# Patient Record
Sex: MALE | Race: BLACK OR AFRICAN AMERICAN | NOT HISPANIC OR LATINO | URBAN - METROPOLITAN AREA
[De-identification: names, ages, dates, MRNs, and addresses within clinical notes are randomized per-mention and may not be internally consistent; named-entity substitution may affect disease eponyms.]

---

## 2022-03-25 ENCOUNTER — CLAIMS CREATED FROM THE CLAIM WINDOW (OUTPATIENT)
Dept: URBAN - METROPOLITAN AREA CLINIC 94 | Facility: CLINIC | Age: 57
End: 2022-03-25
Payer: COMMERCIAL

## 2022-03-25 ENCOUNTER — WEB ENCOUNTER (OUTPATIENT)
Dept: URBAN - METROPOLITAN AREA CLINIC 94 | Facility: CLINIC | Age: 57
End: 2022-03-25

## 2022-03-25 VITALS
BODY MASS INDEX: 33.2 KG/M2 | TEMPERATURE: 97.7 F | DIASTOLIC BLOOD PRESSURE: 92 MMHG | HEIGHT: 75 IN | HEART RATE: 82 BPM | WEIGHT: 267 LBS | SYSTOLIC BLOOD PRESSURE: 158 MMHG

## 2022-03-25 DIAGNOSIS — R59.1 LYMPHADENOPATHY: ICD-10-CM

## 2022-03-25 DIAGNOSIS — R63.4 WEIGHT LOSS: ICD-10-CM

## 2022-03-25 DIAGNOSIS — K30 INDIGESTION: ICD-10-CM

## 2022-03-25 DIAGNOSIS — K52.9 COLITIS: ICD-10-CM

## 2022-03-25 DIAGNOSIS — R19.7 DIARRHEA, UNSPECIFIED TYPE: ICD-10-CM

## 2022-03-25 PROCEDURE — 99204 OFFICE O/P NEW MOD 45 MIN: CPT | Performed by: PHYSICIAN ASSISTANT

## 2022-03-25 PROCEDURE — 99204 OFFICE O/P NEW MOD 45 MIN: CPT | Performed by: INTERNAL MEDICINE

## 2022-03-25 RX ORDER — ASPIRIN 81 MG/1
1 TABLET TABLET, COATED ORAL ONCE A DAY
Refills: 0 | Status: ON HOLD | COMMUNITY
Start: 1900-01-01

## 2022-03-25 RX ORDER — AMLODIPINE BESYLATE 5 MG/1
1 TABLET TABLET ORAL ONCE A DAY
Status: ACTIVE | COMMUNITY

## 2022-03-25 RX ORDER — LEVOTHYROXINE SODIUM 50 UG/1
1 TABLET IN THE MORNING ON AN EMPTY STOMACH TABLET ORAL ONCE A DAY
Status: ACTIVE | COMMUNITY

## 2022-03-25 RX ORDER — RIVAROXABAN 10 MG/1
1 TABLET TABLET, FILM COATED ORAL ONCE A DAY
Refills: 0 | Status: ACTIVE | COMMUNITY
Start: 1900-01-01

## 2022-03-25 NOTE — HPI-TODAY'S VISIT:
58 yo for diarrhea and weight loss.  Patient is accompanied by his wife.   Pt evaluated today following a 3 week hx of diarrhea. He reports having 3-4 stools/day, denies melena or hematochezia. Until today, patient denied abdominal pain but today while in the office, he is experiencing lower abdominal pain. Denies fever, chills, Pt has also had a 10 lb weight loss due to poor appetite. Pt also feels very lethargic. Pt does report taking Pepto Bismol for sx.   Pt reports a hx of nocturnal indigestion. He wakes up and feels burning in his stomach.This requires him to sit up on pillows. He is taking Nexium OTC before bed which helps some.   Pt was evaluated at [facility] 3/20/2021  CT Multiple enlarged lymph nodes in the abdomen and pelvis and lower mediastinum, Fatty infiltration proximal colonic wall may suggest a chronic proximal colitis. No bowel obstruction or evidence of acute bowel inflammation. There is evidence of prior ventral abdominal hernia repair. WBC 15, Hgb 13, GI PCR negative Rx Lomotil x 1   PCP also ordered GI PCR negative, ,   PMH: PE/DVT Xarelto Rx by hem/onc,  RA  Dad has prostate cancer.  Previous Colonsocopy 1/2017 - benign stricture sigmoid colon - non-transversed, Referred to colorectal DR Zelaya- Laparoscopic-assisted left colectomy with colocolic anastomosis 2/2017, Bx Colonic mucosa with ulceration, hemorrhage, reactive/regenerative crypts and features of ischemic colitis.   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 Langston- internal hemorrhoids otherwise normal to cecum.   EGD 2015 bile gastritis and duodenitis

## 2022-03-25 NOTE — PHYSICAL EXAM CHEST:
no lesions,  no deformities,  no traumatic injuries,  no significant scars are present,  chest wall non-tender,  no masses present, breathing is unlabored without accessory muscle use,normal breath sounds I will STOP taking the medications listed below when I get home from the hospital:  None

## 2022-03-25 NOTE — PHYSICAL EXAM GASTROINTESTINAL
Abdomen , soft, epigastric and lower abdominal tender, nondistended , no guarding or rigidity , no masses palpable , normal bowel sounds , Liver and Spleen , no hepatomegaly present , no hepatosplenomegaly , liver nontender , spleen not palpable

## 2022-04-05 ENCOUNTER — OFFICE VISIT (OUTPATIENT)
Dept: URBAN - METROPOLITAN AREA SURGERY CENTER 17 | Facility: SURGERY CENTER | Age: 57
End: 2022-04-05
Payer: COMMERCIAL

## 2022-04-05 DIAGNOSIS — D12.0 ADENOMA OF CECUM: ICD-10-CM

## 2022-04-05 DIAGNOSIS — R19.7 ACUTE DIARRHEA: ICD-10-CM

## 2022-04-05 DIAGNOSIS — K63.89 BACTERIAL OVERGROWTH SYNDROME: ICD-10-CM

## 2022-04-05 PROCEDURE — G8907 PT DOC NO EVENTS ON DISCHARG: HCPCS | Performed by: INTERNAL MEDICINE

## 2022-04-05 PROCEDURE — 45380 COLONOSCOPY AND BIOPSY: CPT | Performed by: INTERNAL MEDICINE

## 2022-04-05 RX ORDER — RIVAROXABAN 10 MG/1
1 TABLET TABLET, FILM COATED ORAL ONCE A DAY
Refills: 0 | Status: ACTIVE | COMMUNITY
Start: 1900-01-01

## 2022-04-05 RX ORDER — ASPIRIN 81 MG/1
1 TABLET TABLET, COATED ORAL ONCE A DAY
Refills: 0 | Status: ON HOLD | COMMUNITY
Start: 1900-01-01

## 2022-04-05 RX ORDER — LEVOTHYROXINE SODIUM 50 UG/1
1 TABLET IN THE MORNING ON AN EMPTY STOMACH TABLET ORAL ONCE A DAY
Status: ACTIVE | COMMUNITY

## 2022-04-05 RX ORDER — CHOLESTYRAMINE 4 G/9G
1 SCOOP POWDER, FOR SUSPENSION ORAL TWICE A DAY
Qty: 1 | Refills: 3 | OUTPATIENT

## 2022-04-05 RX ORDER — AMLODIPINE BESYLATE 5 MG/1
1 TABLET TABLET ORAL ONCE A DAY
Status: ACTIVE | COMMUNITY

## 2022-04-20 ENCOUNTER — OFFICE VISIT (OUTPATIENT)
Dept: URBAN - METROPOLITAN AREA CLINIC 74 | Facility: CLINIC | Age: 57
End: 2022-04-20

## 2022-04-28 ENCOUNTER — TELEPHONE ENCOUNTER (OUTPATIENT)
Dept: URBAN - METROPOLITAN AREA CLINIC 94 | Facility: CLINIC | Age: 57
End: 2022-04-28

## 2022-04-29 ENCOUNTER — TELEPHONE ENCOUNTER (OUTPATIENT)
Dept: URBAN - METROPOLITAN AREA CLINIC 94 | Facility: CLINIC | Age: 57
End: 2022-04-29

## 2022-04-29 ENCOUNTER — OFFICE VISIT (OUTPATIENT)
Dept: URBAN - METROPOLITAN AREA SURGERY CENTER 17 | Facility: SURGERY CENTER | Age: 57
End: 2022-04-29
Payer: COMMERCIAL

## 2022-04-29 DIAGNOSIS — R63.4 ABNORMAL INTENTIONAL WEIGHT LOSS: ICD-10-CM

## 2022-04-29 DIAGNOSIS — K29.60 ADENOPAPILLOMATOSIS GASTRICA: ICD-10-CM

## 2022-04-29 DIAGNOSIS — R12 BURNING REFLUX: ICD-10-CM

## 2022-04-29 DIAGNOSIS — K29.80 ACUTE DUODENITIS: ICD-10-CM

## 2022-04-29 PROCEDURE — 43239 EGD BIOPSY SINGLE/MULTIPLE: CPT | Performed by: INTERNAL MEDICINE

## 2022-04-29 PROCEDURE — G8907 PT DOC NO EVENTS ON DISCHARG: HCPCS | Performed by: INTERNAL MEDICINE

## 2022-04-29 RX ORDER — RIVAROXABAN 10 MG/1
1 TABLET TABLET, FILM COATED ORAL ONCE A DAY
Refills: 0 | Status: ACTIVE | COMMUNITY
Start: 1900-01-01

## 2022-04-29 RX ORDER — CHOLESTYRAMINE 4 G/9G
1 SCOOP POWDER, FOR SUSPENSION ORAL TWICE A DAY
Qty: 1 | Refills: 3 | Status: ACTIVE | COMMUNITY

## 2022-04-29 RX ORDER — ASPIRIN 81 MG/1
1 TABLET TABLET, COATED ORAL ONCE A DAY
Refills: 0 | Status: ON HOLD | COMMUNITY
Start: 1900-01-01

## 2022-04-29 RX ORDER — AMLODIPINE BESYLATE 5 MG/1
1 TABLET TABLET ORAL ONCE A DAY
Status: ACTIVE | COMMUNITY

## 2022-04-29 RX ORDER — LEVOTHYROXINE SODIUM 50 UG/1
1 TABLET IN THE MORNING ON AN EMPTY STOMACH TABLET ORAL ONCE A DAY
Status: ACTIVE | COMMUNITY

## 2022-05-04 ENCOUNTER — OFFICE VISIT (OUTPATIENT)
Dept: URBAN - METROPOLITAN AREA SURGERY CENTER 17 | Facility: SURGERY CENTER | Age: 57
End: 2022-05-04

## 2022-05-05 ENCOUNTER — TELEPHONE ENCOUNTER (OUTPATIENT)
Dept: URBAN - METROPOLITAN AREA CLINIC 94 | Facility: CLINIC | Age: 57
End: 2022-05-05

## 2022-05-05 ENCOUNTER — TELEPHONE ENCOUNTER (OUTPATIENT)
Dept: URBAN - METROPOLITAN AREA CLINIC 40 | Facility: CLINIC | Age: 57
End: 2022-05-05

## 2022-05-11 ENCOUNTER — OFFICE VISIT (OUTPATIENT)
Dept: URBAN - METROPOLITAN AREA CLINIC 92 | Facility: CLINIC | Age: 57
End: 2022-05-11
Payer: COMMERCIAL

## 2022-05-11 VITALS
HEART RATE: 84 BPM | TEMPERATURE: 97.8 F | SYSTOLIC BLOOD PRESSURE: 138 MMHG | WEIGHT: 245 LBS | HEIGHT: 75 IN | BODY MASS INDEX: 30.46 KG/M2 | DIASTOLIC BLOOD PRESSURE: 92 MMHG

## 2022-05-11 DIAGNOSIS — R19.7 DIARRHEA, UNSPECIFIED TYPE: ICD-10-CM

## 2022-05-11 DIAGNOSIS — R59.1 LYMPHADENOPATHY: ICD-10-CM

## 2022-05-11 DIAGNOSIS — R63.4 WEIGHT LOSS: ICD-10-CM

## 2022-05-11 PROCEDURE — 99215 OFFICE O/P EST HI 40 MIN: CPT | Performed by: INTERNAL MEDICINE

## 2022-05-11 RX ORDER — AMLODIPINE BESYLATE 5 MG/1
1 TABLET TABLET ORAL ONCE A DAY
Status: ACTIVE | COMMUNITY

## 2022-05-11 RX ORDER — ASPIRIN 81 MG/1
1 TABLET TABLET, COATED ORAL ONCE A DAY
Refills: 0 | Status: ON HOLD | COMMUNITY
Start: 1900-01-01

## 2022-05-11 RX ORDER — RIVAROXABAN 10 MG/1
1 TABLET TABLET, FILM COATED ORAL ONCE A DAY
Refills: 0 | Status: ACTIVE | COMMUNITY
Start: 1900-01-01

## 2022-05-11 RX ORDER — LEVOTHYROXINE SODIUM 50 UG/1
1 TABLET IN THE MORNING ON AN EMPTY STOMACH TABLET ORAL ONCE A DAY
Status: ON HOLD | COMMUNITY

## 2022-05-11 RX ORDER — CHOLESTYRAMINE 4 G/9G
1 SCOOP POWDER, FOR SUSPENSION ORAL TWICE A DAY
Qty: 1 | Refills: 3 | Status: ON HOLD | COMMUNITY

## 2022-05-11 NOTE — HPI-TODAY'S VISIT:
This is a 57-year-old male who now presents for follow-up.  He has MGUS and protein S deficiency with a history of PE/DVT and is on anticoagulation with Xarelto. He previously underwent a colonoscopy in January 2017 that demonstrated benign stricture in the sigmoid colon that was not traversed.  He underwent laparoscopic-assisted left colectomy with colocolonic anastomosis by Dr. Nazario Singh in February 2017. Final pathology revealed colonic mucosa with ulceration and hemorrhage and reactive/regenerative crept features suggestive of ischemic colitis.     He has experienced 30 lb unintentional weight loss in the past several months associated with diarrhea and flushing.  He has been undergoing evaluation by Hematology/Oncology and had a CT of the abdomen and pelvis on 03/20/2022 demonstrating multiple prominent enlarged lymph nodes throughout the abdominal mesentery and retroperitoneum the largest is an enlarged periportal lymph node measuring 4.2 cm.  There is a fatty infiltration around the proximal colonic wall suggestive of chronic proximal colitis.  EGD and colonoscopy on 04/05/2022 showed granular terminal ileum and prior end-to-end colocolonic anastomosis in the sigmoid colon with retained stool residue as well as internal hemorrhoids.  Biopsies exclude microscopic colitis.  EGD on 04/29/2022 demonstrated a small hiatal hernia and gastritis.  Random biopsy and the antrum and duodenum are pending.   He underwent a percutaneous biopsy of the lymph node yesterday and was restarted on Xarelto.  He has experienced lightheadedness and had a near syncopal episode on the way to the clinic.

## 2022-05-16 ENCOUNTER — TELEPHONE ENCOUNTER (OUTPATIENT)
Dept: URBAN - METROPOLITAN AREA CLINIC 94 | Facility: CLINIC | Age: 57
End: 2022-05-16

## 2022-07-26 ENCOUNTER — OFFICE VISIT (OUTPATIENT)
Dept: URBAN - METROPOLITAN AREA CLINIC 94 | Facility: CLINIC | Age: 57
End: 2022-07-26

## 2022-07-26 ENCOUNTER — OFFICE VISIT (OUTPATIENT)
Dept: URBAN - METROPOLITAN AREA CLINIC 40 | Facility: CLINIC | Age: 57
End: 2022-07-26

## 2022-07-26 ENCOUNTER — LAB OUTSIDE AN ENCOUNTER (OUTPATIENT)
Dept: URBAN - METROPOLITAN AREA CLINIC 40 | Facility: CLINIC | Age: 57
End: 2022-07-26

## 2022-07-26 ENCOUNTER — OFFICE VISIT (OUTPATIENT)
Dept: URBAN - METROPOLITAN AREA CLINIC 40 | Facility: CLINIC | Age: 57
End: 2022-07-26
Payer: COMMERCIAL

## 2022-07-26 VITALS
HEIGHT: 75 IN | DIASTOLIC BLOOD PRESSURE: 80 MMHG | SYSTOLIC BLOOD PRESSURE: 130 MMHG | TEMPERATURE: 98.1 F | BODY MASS INDEX: 29.47 KG/M2 | WEIGHT: 237 LBS | HEART RATE: 78 BPM

## 2022-07-26 DIAGNOSIS — R19.7 DIARRHEA, UNSPECIFIED TYPE: ICD-10-CM

## 2022-07-26 DIAGNOSIS — R63.4 WEIGHT LOSS: ICD-10-CM

## 2022-07-26 PROCEDURE — 99214 OFFICE O/P EST MOD 30 MIN: CPT | Performed by: INTERNAL MEDICINE

## 2022-07-26 RX ORDER — AMLODIPINE BESYLATE 5 MG/1
1 TABLET TABLET ORAL ONCE A DAY
Status: ACTIVE | COMMUNITY

## 2022-07-26 RX ORDER — COLESTIPOL HYDROCHLORIDE 1 G/1
1 TABLET TABLET, FILM COATED ORAL
Qty: 180 | Refills: 1 | OUTPATIENT
Start: 2022-07-26

## 2022-07-26 RX ORDER — ASPIRIN 81 MG/1
1 TABLET TABLET, COATED ORAL ONCE A DAY
Refills: 0 | Status: DISCONTINUED | COMMUNITY
Start: 1900-01-01

## 2022-07-26 RX ORDER — RIVAROXABAN 10 MG/1
1 TABLET TABLET, FILM COATED ORAL ONCE A DAY
Refills: 0 | Status: ACTIVE | COMMUNITY
Start: 1900-01-01

## 2022-07-26 RX ORDER — LEVOTHYROXINE SODIUM 50 UG/1
1 TABLET IN THE MORNING ON AN EMPTY STOMACH TABLET ORAL ONCE A DAY
Status: DISCONTINUED | COMMUNITY

## 2022-07-26 RX ORDER — CHOLESTYRAMINE 4 G/9G
1 SCOOP POWDER, FOR SUSPENSION ORAL TWICE A DAY
Qty: 1 | Refills: 3 | Status: DISCONTINUED | COMMUNITY

## 2022-07-26 NOTE — HPI-TODAY'S VISIT:
Mr Burns is a 57-year-old black male presenting for follow-up of weight loss and diarrhea.  He has a history of ischemic colitis status post resection in 2017.  Patient has been seen by multiple providers in our practice including Dr. Langston, Dr. Romeo and most recently Dr. Rebolledo who he had requested a second opinion from. He was to have seen Dr Langston today but was placed in my clinic due to Dr Langston having an emergency and not seeing patients today.  The patient reports issues with diarrhea and a 30 pound weight loss over the last several months.  Work-up has included stool studies that were reportedly negative for infection.  He had an EGD and colonoscopy in April.  Colonoscopy had a suboptimal prep.  Biopsies were negative for microscopic colitis.  EGD was notable for gastroduodenitis.  Patient has a history of MGUS and protein S deficiency managed by Dr. Taveras.  Dr. Taveras's last note from May 18 was reviewed.  Most recent CT scan was in March that was interpreted by [facility] in April.  This was significant for hepatomegaly.  Spleen pancreas gallbladder was unremarkable.  There was a cyst in the left kidney.  Lymph nodes were noted to be enlarged.  Patient had a lymph node biopsy that showed benign changes.  Hematology's note indicates that they were treating him with Lomotil as well as Carafate and Protonix and Zofran.  He is also instructed to consume 5 Ensure/boost shakes daily in 64 ounces of fluid.  Patient states that the Lomotil helps slow things down but this soon as he stops taking the medication the diarrhea returns.  He states he is eating fine his appetite is good but as soon as he eats, an hour later it gives him the urgency to have a bowel movement.  He is having 3-4 stools a day.  They are not waking him from sleep.  There is no blood in the stool or abdominal pain.  He is noting significant weakness with his symptoms.

## 2022-08-02 LAB
FECAL FAT, QUALITATIVE: (no result)
GASTROINTESTINAL PATHOGEN: (no result)
PANCREATICELASTASE ELISA, STOOL: (no result)

## 2022-08-29 ENCOUNTER — TELEPHONE ENCOUNTER (OUTPATIENT)
Dept: URBAN - METROPOLITAN AREA CLINIC 40 | Facility: CLINIC | Age: 57
End: 2022-08-29

## 2022-09-06 ENCOUNTER — OFFICE VISIT (OUTPATIENT)
Dept: URBAN - METROPOLITAN AREA CLINIC 40 | Facility: CLINIC | Age: 57
End: 2022-09-06

## 2022-09-06 RX ORDER — COLESTIPOL HYDROCHLORIDE 1 G/1
1 TABLET TABLET, FILM COATED ORAL
Qty: 180 | Refills: 1 | Status: ACTIVE | COMMUNITY
Start: 2022-07-26

## 2022-09-06 RX ORDER — AMLODIPINE BESYLATE 5 MG/1
1 TABLET TABLET ORAL ONCE A DAY
Status: ACTIVE | COMMUNITY

## 2022-09-06 RX ORDER — RIVAROXABAN 10 MG/1
1 TABLET TABLET, FILM COATED ORAL ONCE A DAY
Refills: 0 | Status: ACTIVE | COMMUNITY
Start: 1900-01-01

## 2022-09-06 RX ORDER — COLESTIPOL HYDROCHLORIDE 1 G/1
1 TABLET TABLET, FILM COATED ORAL
Qty: 180 | Refills: 1 | OUTPATIENT

## 2022-09-06 RX ORDER — MOMETASONE FUROATE 1 MG/G
1 APPLICATION OINTMENT TOPICAL ONCE A DAY
Status: ACTIVE | COMMUNITY

## 2022-09-06 RX ORDER — DIPHENOXYLATE HYDROCHLORIDE AND ATROPINE SULFATE 2.5; .025 MG/5ML; MG/5ML
5 ML AS NEEDED SOLUTION ORAL
Status: ACTIVE | COMMUNITY

## 2022-09-06 NOTE — HPI-TODAY'S VISIT:
Mr Burns is a 57-year-old black male presenting for follow-up of weight loss and diarrhea.  He has a history of ischemic colitis status post resection in 2017.  Patient has been seen by multiple providers in our practice including Dr. Langston, Dr. Romeo and most recently Dr. Rebolledo who he had requested a second opinion from. He was to have seen Dr Langston today but was placed in my clinic due to Dr Langston having an emergency and not seeing patients today.  The patient reports issues with diarrhea and a 30 pound weight loss over the last several months.  Work-up has included stool studies that were reportedly negative for infection.  He had an EGD and colonoscopy in April.  Colonoscopy had a suboptimal prep.  Biopsies were negative for microscopic colitis.  EGD was notable for gastroduodenitis.  Patient has a history of MGUS and protein S deficiency managed by Dr. Taveras.  Dr. Taveras's last note from May 18 was reviewed.  Most recent CT scan was in March that was interpreted by [facility] in April.  This was significant for hepatomegaly.  Spleen pancreas gallbladder was unremarkable.  There was a cyst in the left kidney.  Lymph nodes were noted to be enlarged.  Patient had a lymph node biopsy that showed benign changes.  Hematology's note indicates that they were treating him with Lomotil as well as Carafate and Protonix and Zofran.  He is also instructed to consume 5 Ensure/boost shakes daily in 64 ounces of fluid.  Patient states that the Lomotil helps slow things down but this soon as he stops taking the medication the diarrhea returns.  He states he is eating fine his appetite is good but as soon as he eats, an hour later it gives him the urgency to have a bowel movement.  He is having 3-4 stools a day.  They are not waking him from sleep.  There is no blood in the stool or abdominal pain.  He is noting significant weakness with his symptoms. Patient underwent a GPP 14 which was completely negative including negative qualitative fecal fat this was collected on 7/26/2022.

## 2022-11-24 ENCOUNTER — DASHBOARD ENCOUNTERS (OUTPATIENT)
Age: 57
End: 2022-11-24

## 2022-11-24 PROBLEM — 162031009: Status: ACTIVE | Noted: 2022-03-27

## 2022-11-29 ENCOUNTER — OFFICE VISIT (OUTPATIENT)
Dept: URBAN - METROPOLITAN AREA CLINIC 40 | Facility: CLINIC | Age: 57
End: 2022-11-29

## 2022-11-29 RX ORDER — COLESTIPOL HYDROCHLORIDE 1 G/1
1 TABLET TABLET, FILM COATED ORAL
Qty: 180 | Refills: 1 | OUTPATIENT

## 2022-11-29 NOTE — HPI-TODAY'S VISIT:
Mr Burns is a 57-year-old black male presenting for follow-up of weight loss and diarrhea.  He has a history of ischemic colitis status post resection in 2017.  Patient has been seen by multiple providers in our practice including Dr. Langston, Dr. Romeo and most recently Dr. Rebolledo who he had requested a second opinion from. He was to have seen Dr Langston today but was placed in my clinic due to Dr Langston having an emergency and not seeing patients today.  The patient reports issues with diarrhea and a 30 pound weight loss over the last several months.  Work-up has included stool studies that were reportedly negative for infection.  He had an EGD and colonoscopy in April.  Colonoscopy had a suboptimal prep.  Biopsies were negative for microscopic colitis.  EGD was notable for gastroduodenitis.  Patient has a history of MGUS and protein S deficiency managed by Dr. Taveras.  Dr. Taveras's last note from May 18 was reviewed.  Most recent CT scan was in March that was interpreted by [facility] in April.  This was significant for hepatomegaly.  Spleen pancreas gallbladder was unremarkable.  There was a cyst in the left kidney.  Lymph nodes were noted to be enlarged.  Patient had a lymph node biopsy that showed benign changes.  Hematology's note indicates that they were treating him with Lomotil as well as Carafate and Protonix and Zofran.  He is also instructed to consume 5 Ensure/boost shakes daily in 64 ounces of fluid.  Patient states that the Lomotil helps slow things down but this soon as he stops taking the medication the diarrhea returns.  He states he is eating fine his appetite is good but as soon as he eats, an hour later it gives him the urgency to have a bowel movement.  He is having 3-4 stools a day.  They are not waking him from sleep.  There is no blood in the stool or abdominal pain.  He is noting significant weakness with his symptoms. Patient underwent a GPP 14 which was completely negative including negative qualitative fecal fat this was collected on 7/26/2022. Patient underwent lab work on 8/1/2022 included an essentially normal CBC and a CMP which showed his bilirubin has decreased to 1.9 albumin 2.7 otherwise CMP was essentially normal.